# Patient Record
Sex: MALE | Race: WHITE | URBAN - METROPOLITAN AREA
[De-identification: names, ages, dates, MRNs, and addresses within clinical notes are randomized per-mention and may not be internally consistent; named-entity substitution may affect disease eponyms.]

---

## 2017-02-14 NOTE — PATIENT DISCUSSION
(H25.13) Age-related nuclear cataract, bilateral - Assesment : Examination revealed cataract. Mil symptoms - Plan : Monitor for changes. Patient requested to monitor their vision for further visual changes. Update glasses Rx today. Patient to return in one year for complete exam and mac OCT.

## 2017-03-23 ENCOUNTER — IMPORTED ENCOUNTER (OUTPATIENT)
Dept: URBAN - METROPOLITAN AREA CLINIC 50 | Facility: CLINIC | Age: 67
End: 2017-03-23

## 2017-03-27 ENCOUNTER — IMPORTED ENCOUNTER (OUTPATIENT)
Dept: URBAN - METROPOLITAN AREA CLINIC 50 | Facility: CLINIC | Age: 67
End: 2017-03-27

## 2018-01-22 ENCOUNTER — IMPORTED ENCOUNTER (OUTPATIENT)
Dept: URBAN - METROPOLITAN AREA CLINIC 50 | Facility: CLINIC | Age: 68
End: 2018-01-22

## 2018-02-05 ENCOUNTER — IMPORTED ENCOUNTER (OUTPATIENT)
Dept: URBAN - METROPOLITAN AREA CLINIC 50 | Facility: CLINIC | Age: 68
End: 2018-02-05

## 2018-02-19 ENCOUNTER — IMPORTED ENCOUNTER (OUTPATIENT)
Dept: URBAN - METROPOLITAN AREA CLINIC 50 | Facility: CLINIC | Age: 68
End: 2018-02-19

## 2019-02-07 NOTE — PATIENT DISCUSSION
(H25.13) Age-related nuclear cataract, bilateral - Assesment : Examination revealed cataract. PATIENT IS CLEARED FOR SURGERY WHEN HE IS READY. VISUAL OUTCOME MAY BE LIMITED OS SECONDARY TO OLD JODY OS. PT STATED HIS CURRENT VISION FLUCTUATES DURING THE DAY. ADVISED PATIENT HIS TEAR FILM CAN CONTRIBUTE TO VISION FLUCTUATING. CATARACT SURGERY IS NOT LIKELY GOING TO ELIMINATE FLUCTUATIONS. PATIENT WOULD LIKE TO TARGET OU AT DISTANCE AND WEAR GLASSES FOR READING. OS IS SLIGHTLY NEARSIGHTED. MINIMAL ASTIGMATIMS OS. MILD ASTIGMATISM OD, MAY CONSIDER TORIC - Plan : Recommend package with advanced technology for the management and treatment of astigmatism and/or to aid in the management of presbyopia with blended vision, mono vision, or multifocal IOL. The astigmatism management may include Toric IOL placed intraoperatively, or LRI intraoperatively or as included postoperative treatment, or PRK treatment postoperatively. Risks, Benefits and Alternatives were discussed with patient at length for Cataract Surgery. Visual symptoms are consistent with Cataract findings on examination and current refraction no longer provides satisfactory vision. Patient understands and desires surgery. All questions answered. Risks, Benefits and Alternatives discussed at length for IOL placement. Patient will need to wear glasses for NEAR VISION. EYE: OS  IOL TYPE: MONOFOCAL  POST OPERATIVE TARGET: DISTANCE PLANO TO -0.50  RECOMMENDED PACKAGE:  TP  PT PREFERRED PACKAGE:  LF  OD TO POSSIBLY FOLLOW - CONSIDER TORIC  Patient to see surgery counselor today.

## 2019-04-29 ENCOUNTER — IMPORTED ENCOUNTER (OUTPATIENT)
Dept: URBAN - METROPOLITAN AREA CLINIC 50 | Facility: CLINIC | Age: 69
End: 2019-04-29

## 2019-06-03 ENCOUNTER — IMPORTED ENCOUNTER (OUTPATIENT)
Dept: URBAN - METROPOLITAN AREA CLINIC 50 | Facility: CLINIC | Age: 69
End: 2019-06-03

## 2019-07-24 ENCOUNTER — IMPORTED ENCOUNTER (OUTPATIENT)
Dept: URBAN - METROPOLITAN AREA CLINIC 50 | Facility: CLINIC | Age: 69
End: 2019-07-24

## 2019-08-12 NOTE — PATIENT DISCUSSION
(H25.13) Age-related nuclear cataract, bilateral - Assesment : Examination revealed cataract OU. ANATOMY OF EYE DISCUSSED WITH PATIENT. PATIENT IS CLEARED FOR SURGERY WHEN HE IS READY. CLARITY AND QUALITY OF VISION WOULD BE IMPROVED WITH CATARACT SURGERY. PATIENT HAS TROUBLE READING. PATIENT WAS CONCERNED ABOUT HAVING SURGERY AT HIS AGE. THE CATARACTS ARE SIGNIFICANT AND IT DOES NOT MAKE SENSE TO WAIT, DESPITE HIS AGE. IF PATIENT HAS UNDERLYING RETINA ISSUES IT COULD BE A FACTOR WITH THE OUTCOME OF THE SURGERY AND VISION. PATIENT COMPLAINED OF SEEING A LINE IN HIS VISION OS. WILL DISCUSS THE SURGERY IN DETAIL WHEN HE COMES BACK FOR [facility]AN - Plan : PATIENT TO SEE SURGERY COUNSELOR TODAY.   PLAN ON OS, THEN OD
(T15. 12XA) Foreign body in conjunctival sac, left eye, init encntr - Assesment : Examination revealed foreign body REMOVED (EYELASH) WITHOUT COMPLICATIONS AT SLIT LAMP WITH QTIP - Plan : OBSERVE
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
admission

## 2019-09-23 ENCOUNTER — IMPORTED ENCOUNTER (OUTPATIENT)
Dept: URBAN - METROPOLITAN AREA CLINIC 50 | Facility: CLINIC | Age: 69
End: 2019-09-23

## 2019-09-23 NOTE — PATIENT DISCUSSION
"""Recommend Bilateral Upper Lid Blepharoplasty with Levator Repair 10/08/2019 at [facility]

## 2019-09-27 ENCOUNTER — IMPORTED ENCOUNTER (OUTPATIENT)
Dept: URBAN - METROPOLITAN AREA CLINIC 50 | Facility: CLINIC | Age: 69
End: 2019-09-27

## 2019-10-30 ENCOUNTER — IMPORTED ENCOUNTER (OUTPATIENT)
Dept: URBAN - METROPOLITAN AREA CLINIC 50 | Facility: CLINIC | Age: 69
End: 2019-10-30

## 2019-10-30 NOTE — PATIENT DISCUSSION
"""Recommend Bilateral Levator Repair 11/12/2019 at [facility].  Discussed with patient [email or web address]

## 2019-11-20 ENCOUNTER — IMPORTED ENCOUNTER (OUTPATIENT)
Dept: URBAN - METROPOLITAN AREA CLINIC 50 | Facility: CLINIC | Age: 69
End: 2019-11-20

## 2019-12-11 ENCOUNTER — IMPORTED ENCOUNTER (OUTPATIENT)
Dept: URBAN - METROPOLITAN AREA CLINIC 50 | Facility: CLINIC | Age: 69
End: 2019-12-11

## 2020-08-17 ENCOUNTER — IMPORTED ENCOUNTER (OUTPATIENT)
Dept: URBAN - METROPOLITAN AREA CLINIC 50 | Facility: CLINIC | Age: 70
End: 2020-08-17

## 2021-04-17 ASSESSMENT — VISUAL ACUITY
OD_OTHER: 20/25. 20/30.
OS_OTHER: 20/40. 20/70.
OD_SC: 20/20
OD_OTHER: 20/30.
OS_SC: 20/60 ECC
OS_CC: J1
OD_SC: 20/20
OD_SC: 20/25
OD_SC: 20/25
OS_CC: J1
OD_CC: J1
OD_SC: 20/25
OS_SC: 20/200
OD_SC: 20/20
OS_BAT: 20/40
OS_SC: 20/25
OD_OTHER: 20/400.
OS_OTHER: 20/40. 20/50.
OS_BAT: 20/40
OS_BAT: 20/60
OD_CC: J1
OS_SC: 20/60 ECC
OD_BAT: 20/400
OD_BAT: 20/25
OS_SC: 20/25
OD_SC: 20/20-1
OS_OTHER: 20/60. 20/70.
OD_SC: 20/30
OD_SC: 20/20

## 2021-04-17 ASSESSMENT — TONOMETRY
OS_IOP_MMHG: 18
OD_IOP_MMHG: 16
OS_IOP_MMHG: 18
OS_IOP_MMHG: 16
OD_IOP_MMHG: 18
OS_IOP_MMHG: 16
OD_IOP_MMHG: 15
OD_IOP_MMHG: 17
OD_IOP_MMHG: 16
OS_IOP_MMHG: 16
OD_IOP_MMHG: 18
OS_IOP_MMHG: 18
OS_IOP_MMHG: 18
OS_IOP_MMHG: 17
OD_IOP_MMHG: 16
OD_IOP_MMHG: 19

## 2023-10-27 ENCOUNTER — NEW PATIENT (OUTPATIENT)
Dept: URBAN - METROPOLITAN AREA CLINIC 49 | Facility: LOCATION | Age: 73
End: 2023-10-27

## 2023-10-27 DIAGNOSIS — E11.9: ICD-10-CM

## 2023-10-27 DIAGNOSIS — H35.033: ICD-10-CM

## 2023-10-27 DIAGNOSIS — H43.812: ICD-10-CM

## 2023-10-27 DIAGNOSIS — H02.831: ICD-10-CM

## 2023-10-27 DIAGNOSIS — H26.492: ICD-10-CM

## 2023-10-27 PROCEDURE — 92004 COMPRE OPH EXAM NEW PT 1/>: CPT

## 2023-10-27 PROCEDURE — 92134 CPTRZ OPH DX IMG PST SGM RTA: CPT

## 2023-10-27 PROCEDURE — 76514 ECHO EXAM OF EYE THICKNESS: CPT

## 2023-10-27 ASSESSMENT — TONOMETRY
OD_IOP_MMHG: 19
OS_IOP_MMHG: 25
OD_IOP_MMHG: 21
OS_IOP_MMHG: 19

## 2023-10-27 ASSESSMENT — PACHYMETRY
OD_CT_UM: 579
OS_CT_UM: 621

## 2023-10-27 ASSESSMENT — VISUAL ACUITY
OU_SC: J2 @ 15IN
OD_GLARE: 20/30
OS_SC: 20/50-1
OU_SC: 20/20
OD_SC: 20/20
OD_GLARE: 20/30

## 2024-11-07 ENCOUNTER — APPOINTMENT (RX ONLY)
Dept: URBAN - METROPOLITAN AREA CLINIC 342 | Facility: CLINIC | Age: 74
Setting detail: DERMATOLOGY
End: 2024-11-07

## 2024-11-07 DIAGNOSIS — B35.2 TINEA MANUUM: ICD-10-CM | Status: INADEQUATELY CONTROLLED

## 2024-11-07 DIAGNOSIS — L82.1 OTHER SEBORRHEIC KERATOSIS: ICD-10-CM | Status: STABLE

## 2024-11-07 DIAGNOSIS — L30.8 OTHER SPECIFIED DERMATITIS: ICD-10-CM | Status: INADEQUATELY CONTROLLED

## 2024-11-07 PROCEDURE — 99204 OFFICE O/P NEW MOD 45 MIN: CPT

## 2024-11-07 PROCEDURE — ? PRESCRIPTION

## 2024-11-07 PROCEDURE — ? COUNSELING

## 2024-11-07 PROCEDURE — ? PHOTO-DOCUMENTATION

## 2024-11-07 PROCEDURE — ? TREATMENT GOALS

## 2024-11-07 PROCEDURE — ? PRESCRIPTION MEDICATION MANAGEMENT

## 2024-11-07 PROCEDURE — ? ITCH-SCRATCH CYCLE COUNSELING

## 2024-11-07 RX ORDER — KETOCONAZOLE 20 MG/G
1 CREAM TOPICAL BID
Qty: 30 | Refills: 0 | Status: ERX | COMMUNITY
Start: 2024-11-07

## 2024-11-07 RX ORDER — CLOBETASOL PROPIONATE 0.5 MG/G
1 CREAM TOPICAL BID
Qty: 60 | Refills: 0 | Status: ERX | COMMUNITY
Start: 2024-11-07

## 2024-11-07 RX ADMIN — CLOBETASOL PROPIONATE 1: 0.5 CREAM TOPICAL at 00:00

## 2024-11-07 RX ADMIN — KETOCONAZOLE 1: 20 CREAM TOPICAL at 00:00

## 2024-11-07 ASSESSMENT — LOCATION DETAILED DESCRIPTION DERM
LOCATION DETAILED: RIGHT DORSAL INDEX METACARPOPHALANGEAL JOINT
LOCATION DETAILED: LEFT CRUS OF HELIX

## 2024-11-07 ASSESSMENT — LOCATION ZONE DERM
LOCATION ZONE: EAR
LOCATION ZONE: HAND

## 2024-11-07 ASSESSMENT — BSA RASH: BSA RASH: 1

## 2024-11-07 ASSESSMENT — LOCATION SIMPLE DESCRIPTION DERM
LOCATION SIMPLE: RIGHT HAND
LOCATION SIMPLE: LEFT EAR

## 2024-11-07 NOTE — PROCEDURE: ITCH-SCRATCH CYCLE COUNSELING
Counseling: I discussed the itch-scratch cycle. I explained that scratching will lead to more itching and this cycle becomes self perpetuating. I discussed methods to prevent scratching in an effort to stop the patient's itching.
Detail Level: Generalized
DISPLAY PLAN FREE TEXT

## 2024-11-07 NOTE — PROCEDURE: PRESCRIPTION MEDICATION MANAGEMENT
Initiate Treatment: .\\nketoconazole 2 % topical cream Quantity: 30.0 g  Days Supply: 30\\nSig: Apply a small amount  to hands twice a day x 2 weeks\\n- you can apply Ketoconazole cream first then clobetasol either way it’s ok.
Render In Strict Bullet Format?: No
Detail Level: Zone
Plan: RTC if not improved or worse
Discontinue Regimen: Anti bacterial soap
Continue Regimen: - gentle cleanser like Cetiphil or CereVa \\n- clobetasol 0.05 % topical cream Quantity: 60.0 g  Days Supply: 30\\nSig: Apply twice daily to rash on hands twice a day  for 2 to 4 weeks  then discontinue. Do not apply to face, groin, breast or folds of skin….. use [employer] card attached to prescription
Initiate Treatment: - use nitrile gloves when cooking or washing dishes \\n- use moisturizer
Plan: Recheck in 2 months and FBE

## 2024-11-07 NOTE — HPI: SKIN LESION
How Severe Is Your Skin Lesion?: mild
Is This A New Presentation, Or A Follow-Up?: Skin Lesion
Additional History: PCP would like a dermatologist look at lesion

## 2025-01-09 ENCOUNTER — APPOINTMENT (OUTPATIENT)
Dept: URBAN - METROPOLITAN AREA CLINIC 342 | Facility: CLINIC | Age: 75
Setting detail: DERMATOLOGY
End: 2025-01-09

## 2025-01-09 DIAGNOSIS — D18.0 HEMANGIOMA: ICD-10-CM | Status: STABLE

## 2025-01-09 DIAGNOSIS — L82.1 OTHER SEBORRHEIC KERATOSIS: ICD-10-CM | Status: STABLE

## 2025-01-09 DIAGNOSIS — L81.4 OTHER MELANIN HYPERPIGMENTATION: ICD-10-CM | Status: STABLE

## 2025-01-09 DIAGNOSIS — D22 MELANOCYTIC NEVI: ICD-10-CM | Status: STABLE

## 2025-01-09 DIAGNOSIS — L30.8 OTHER SPECIFIED DERMATITIS: ICD-10-CM | Status: INADEQUATELY CONTROLLED

## 2025-01-09 DIAGNOSIS — L81.0 POSTINFLAMMATORY HYPERPIGMENTATION: ICD-10-CM

## 2025-01-09 PROBLEM — D22.5 MELANOCYTIC NEVI OF TRUNK: Status: ACTIVE | Noted: 2025-01-09

## 2025-01-09 PROBLEM — D18.01 HEMANGIOMA OF SKIN AND SUBCUTANEOUS TISSUE: Status: ACTIVE | Noted: 2025-01-09

## 2025-01-09 PROCEDURE — ? FULL BODY SKIN EXAM

## 2025-01-09 PROCEDURE — 99214 OFFICE O/P EST MOD 30 MIN: CPT

## 2025-01-09 PROCEDURE — ? TREATMENT GOALS

## 2025-01-09 PROCEDURE — ? TREATMENT REGIMEN

## 2025-01-09 PROCEDURE — ? PRESCRIPTION MEDICATION MANAGEMENT

## 2025-01-09 PROCEDURE — ? COUNSELING

## 2025-01-09 ASSESSMENT — LOCATION DETAILED DESCRIPTION DERM
LOCATION DETAILED: RIGHT INFERIOR LATERAL LOWER BACK
LOCATION DETAILED: RIGHT INFERIOR UPPER BACK
LOCATION DETAILED: RIGHT PROXIMAL DORSAL FOREARM
LOCATION DETAILED: RIGHT INFERIOR MEDIAL UPPER BACK
LOCATION DETAILED: SUPERIOR THORACIC SPINE
LOCATION DETAILED: UPPER STERNUM
LOCATION DETAILED: LEFT PROXIMAL DORSAL FOREARM
LOCATION DETAILED: LEFT ANTERIOR PROXIMAL THIGH
LOCATION DETAILED: RIGHT INFERIOR MEDIAL FOREHEAD
LOCATION DETAILED: LEFT POSTERIOR SHOULDER
LOCATION DETAILED: RIGHT DORSAL INDEX METACARPOPHALANGEAL JOINT
LOCATION DETAILED: RIGHT BUTTOCK
LOCATION DETAILED: EPIGASTRIC SKIN
LOCATION DETAILED: RIGHT POSTERIOR SHOULDER
LOCATION DETAILED: LEFT BUTTOCK

## 2025-01-09 ASSESSMENT — LOCATION ZONE DERM
LOCATION ZONE: LEG
LOCATION ZONE: ARM
LOCATION ZONE: TRUNK
LOCATION ZONE: FACE
LOCATION ZONE: HAND

## 2025-01-09 ASSESSMENT — LOCATION SIMPLE DESCRIPTION DERM
LOCATION SIMPLE: LEFT SHOULDER
LOCATION SIMPLE: RIGHT BUTTOCK
LOCATION SIMPLE: LEFT BUTTOCK
LOCATION SIMPLE: LEFT FOREARM
LOCATION SIMPLE: RIGHT SHOULDER
LOCATION SIMPLE: LEFT THIGH
LOCATION SIMPLE: RIGHT FOREARM
LOCATION SIMPLE: CHEST
LOCATION SIMPLE: ABDOMEN
LOCATION SIMPLE: RIGHT FOREHEAD
LOCATION SIMPLE: UPPER BACK
LOCATION SIMPLE: RIGHT HAND
LOCATION SIMPLE: RIGHT UPPER BACK
LOCATION SIMPLE: RIGHT LOWER BACK

## 2025-01-09 ASSESSMENT — ITCH NUMERIC RATING SCALE: HOW SEVERE IS YOUR ITCHING?: 2

## 2025-01-09 NOTE — PROCEDURE: TREATMENT REGIMEN
Detail Level: Zone
Initiate Treatment: Mineral Based Sunscreen with a minimum of 30 SPF. Reapplication every 2 hrs or sooner depending on activity.\\nMake sure to discard  sunscreen.
Plan: If not resolved by February RTC for evaluation

## 2025-01-09 NOTE — PROCEDURE: PRESCRIPTION MEDICATION MANAGEMENT
Samples Given: Cutemol skin barrier cream, moisturizing creams
Continue Regimen: - gentle cleanser like Cetiphil or CereVa \\n\\n- use nitrile gloves when cooking or washing dishes \\n- use moisturizer\\n\\n- clobetasol 0.05 % topical cream 1-2 times daily prn flare (no refills needed today)
Initiate Treatment: Cutemol skin barrier cream
Render In Strict Bullet Format?: No
Detail Level: Zone

## 2025-06-20 ENCOUNTER — COMPREHENSIVE EXAM (OUTPATIENT)
Age: 75
End: 2025-06-20

## 2025-06-20 DIAGNOSIS — E11.9: ICD-10-CM

## 2025-06-20 DIAGNOSIS — H35.033: ICD-10-CM

## 2025-06-20 DIAGNOSIS — H02.834: ICD-10-CM

## 2025-06-20 DIAGNOSIS — H40.013: ICD-10-CM

## 2025-06-20 DIAGNOSIS — H26.492: ICD-10-CM

## 2025-06-20 DIAGNOSIS — H02.831: ICD-10-CM

## 2025-06-20 DIAGNOSIS — H52.13: ICD-10-CM

## 2025-06-20 DIAGNOSIS — H43.812: ICD-10-CM

## 2025-06-20 PROCEDURE — 99214 OFFICE O/P EST MOD 30 MIN: CPT
